# Patient Record
Sex: MALE | Race: WHITE | Employment: OTHER | URBAN - METROPOLITAN AREA
[De-identification: names, ages, dates, MRNs, and addresses within clinical notes are randomized per-mention and may not be internally consistent; named-entity substitution may affect disease eponyms.]

---

## 2018-01-24 ENCOUNTER — OFFICE VISIT (OUTPATIENT)
Dept: NEUROLOGY | Facility: CLINIC | Age: 73
End: 2018-01-24

## 2018-01-24 ENCOUNTER — TELEPHONE (OUTPATIENT)
Dept: NEUROLOGY | Facility: CLINIC | Age: 73
End: 2018-01-24

## 2018-01-24 VITALS
DIASTOLIC BLOOD PRESSURE: 90 MMHG | RESPIRATION RATE: 16 BRPM | WEIGHT: 180 LBS | HEART RATE: 80 BPM | BODY MASS INDEX: 27 KG/M2 | SYSTOLIC BLOOD PRESSURE: 156 MMHG

## 2018-01-24 DIAGNOSIS — R41.3 MEMORY LOSS: Primary | ICD-10-CM

## 2018-01-24 PROCEDURE — 99205 OFFICE O/P NEW HI 60 MIN: CPT | Performed by: OTHER

## 2018-01-24 RX ORDER — ASCORBIC ACID 500 MG
500 TABLET ORAL DAILY
COMMUNITY

## 2018-01-24 RX ORDER — BUPRENORPHINE HCL 8 MG/1
1 TABLET SUBLINGUAL DAILY
COMMUNITY

## 2018-01-24 NOTE — PATIENT INSTRUCTIONS
Refill policies:    • Allow 2-3 business days for refills; controlled substances may take longer.   • Contact your pharmacy at least 5 days prior to running out of medication and have them send an electronic request or submit request through the [facility] recommended that you have a procedure or additional testing performed. [facility]) will contact your insurance carrier to obtain pre-certification or prior authorization.     Unfortunately, [facility] has seen an increase in denial of paym

## 2018-01-24 NOTE — PROGRESS NOTES
Neurology H&P    Lily Cummings Vivek Patient Status:  No patient class for patient encounter    1945 MRN [id number]   Location [address] [place] [address], [address] [place] [address] Drive, [address] [place] [address] Attending No att. providers found   Hosp Day # 0 PCP JOSEPHINE Larkin past medical history on file. PSHx:  No past surgical history on file. SocHx:  Tobacco/Alcohol use not on file    Family History:  No family history on file.     ROS:  Gen: no unexplained weight loss  Vision: no vision changes, no new blurry or double negative    Labs:       Imaging:  MRI brain from OSH imaging consistent with mild microvascular ischemic changes. No strokes     EEG OSH no ictal or interictal activity. Assessment: This is a 73 y/o male with mild short term memory loss.  His MOCA today

## 2018-07-30 ENCOUNTER — OFFICE VISIT (OUTPATIENT)
Dept: NEUROLOGY | Facility: CLINIC | Age: 73
End: 2018-07-30
Payer: MEDICARE

## 2018-07-30 VITALS
DIASTOLIC BLOOD PRESSURE: 80 MMHG | HEART RATE: 80 BPM | BODY MASS INDEX: 27 KG/M2 | WEIGHT: 184 LBS | SYSTOLIC BLOOD PRESSURE: 140 MMHG

## 2018-07-30 DIAGNOSIS — R41.3 MEMORY LOSS: Primary | ICD-10-CM

## 2018-07-30 PROCEDURE — 99213 OFFICE O/P EST LOW 20 MIN: CPT | Performed by: OTHER

## 2018-07-30 NOTE — PATIENT INSTRUCTIONS
Refill policies:    • Allow 2-3 business days for refills; controlled substances may take longer.   • Contact your pharmacy at least 5 days prior to running out of medication and have them send an electronic request or submit request through the “request re entire amount billed. Precertification and Prior Authorizations: If your physician has recommended that you have a procedure or additional testing performed.   [facility]) will contact your insurance carrier to obtain pre-certi

## 2018-07-30 NOTE — PROGRESS NOTES
Neurology H&P    [place] Debar Fish Patient Status:  No patient class for patient encounter    1945 MRN [id number]   Location ED [facility], [address], [address] [facility] Attending No att. providers found   Hosp Day # 0 PCP JOSEPHINE Morillo memory loss but he declined. He states that since that time his memory has been stable. His wife states \"some days are better than others?  She feels that he is quieter than in years past. Per wife he may become more repetitious if there is a lot of [place] unexplained fevers or chills  Endocrine: no unexplained weight loss or gain, no new fatigue  Musculoskeletal: denies back pain, denies joint pain  Psych: denies depression   Skin: denies any new masses, rashes, lumps or bruising    Objective/Physical Exam: related memory loss  - Referral to neuropsychology for memory loss was discussed but he  Again declines  - MOCA 1/23/18: 26/30  - MOCA 7/30/18: 27/30  - No medications at this time    Can follow up PRN for any new memory changes    Debra Carballo DO  N

## 2018-07-30 NOTE — PROGRESS NOTES
Pt is here for follow up for memory loss. Pt states since the last time we seen him he has been the same with memory.

## 2019-09-26 ENCOUNTER — OFFICE VISIT (OUTPATIENT)
Dept: NEUROLOGY | Facility: CLINIC | Age: 74
End: 2019-09-26
Payer: MEDICARE

## 2019-09-26 VITALS
SYSTOLIC BLOOD PRESSURE: 120 MMHG | BODY MASS INDEX: 27 KG/M2 | HEART RATE: 70 BPM | RESPIRATION RATE: 18 BRPM | WEIGHT: 183 LBS | DIASTOLIC BLOOD PRESSURE: 68 MMHG

## 2019-09-26 DIAGNOSIS — R41.3 MEMORY LOSS: Primary | ICD-10-CM

## 2019-09-26 DIAGNOSIS — H53.9 VISION CHANGES: ICD-10-CM

## 2019-09-26 PROCEDURE — 99214 OFFICE O/P EST MOD 30 MIN: CPT | Performed by: OTHER

## 2019-09-26 RX ORDER — AMLODIPINE BESYLATE 5 MG/1
2.5 TABLET ORAL DAILY
Refills: 1 | COMMUNITY
Start: 2019-09-03

## 2019-09-26 RX ORDER — LATANOPROST 50 UG/ML
1 SOLUTION/ DROPS OPHTHALMIC
Refills: 4 | COMMUNITY
Start: 2019-07-18

## 2019-09-26 RX ORDER — GLIMEPIRIDE 2 MG/1
1 TABLET ORAL
Refills: 6 | COMMUNITY
Start: 2019-06-23 | End: 2020-12-08 | Stop reason: ALTCHOICE

## 2019-09-26 NOTE — PROGRESS NOTES
Neurology H&P    Charlie Jamel Doyle Patient Status:  No patient class for patient encounter    1945 MRN [id number]   Location ED [facility], [address], [address] [facility] Attending No att. providers found   Hosp Day # 0 PCP JOSEPHINE Fitzpatrick occurred on Aug 31st.  He was sitting at the kitchen table and noted a \"Fuzziness\" in his right upper quadrant of his vision. He does not know if this was in one eye or both eyes. He also had a concomitant headache with this fuzzy vision.  All symptoms la mouth daily. Disp:  Rfl:    Vitamin C 500 MG Oral Tab Take 500 mg by mouth daily. Disp:  Rfl:    Cholecalciferol (VITAMIN D) 1000 units Oral Tab Take by mouth daily. Disp:  Rfl:    B Complex-C-Folic Acid Oral Tab Take by mouth daily.  Disp:  Rfl:        Pro EOM intact, facial sensation intact, strong eye closure and lower facial muscles & jaw muscles; palate elevation intact, no dysarthria, no tongue fasciculations or atrophy, strong shoulder shrug.     MOTOR EXAMINATION: normal tone, no fasciculations, normal

## 2019-09-26 NOTE — PROGRESS NOTES
The patient states no stroke like symptoms. The patient states no changes in speech, balance or memory.

## 2019-11-15 ENCOUNTER — TELEPHONE (OUTPATIENT)
Dept: NEUROLOGY | Facility: CLINIC | Age: 74
End: 2019-11-15

## 2019-11-15 NOTE — TELEPHONE ENCOUNTER
Brief Neurology note:  Neuropsychology testing received and sent to scanning. In brief Mr. Doyle has impaired recall both immediate and delayed and appears to have cognitive slowing and decreased verbal fluency.

## 2019-12-03 ENCOUNTER — OFFICE VISIT (OUTPATIENT)
Dept: NEUROLOGY | Facility: CLINIC | Age: 74
End: 2019-12-03
Payer: MEDICARE

## 2019-12-03 VITALS
HEART RATE: 68 BPM | DIASTOLIC BLOOD PRESSURE: 64 MMHG | WEIGHT: 180 LBS | BODY MASS INDEX: 27 KG/M2 | SYSTOLIC BLOOD PRESSURE: 124 MMHG | RESPIRATION RATE: 16 BRPM

## 2019-12-03 DIAGNOSIS — R41.3 MEMORY LOSS: Primary | ICD-10-CM

## 2019-12-03 PROCEDURE — 99213 OFFICE O/P EST LOW 20 MIN: CPT | Performed by: OTHER

## 2019-12-03 RX ORDER — DONEPEZIL HYDROCHLORIDE 5 MG/1
5 TABLET, FILM COATED ORAL NIGHTLY
Qty: 30 TABLET | Refills: 1 | Status: SHIPPED | OUTPATIENT
Start: 2019-12-03 | End: 2020-02-26

## 2019-12-03 NOTE — PROGRESS NOTES
The patient wife states no changes in memory since his last office visit. The patient denies any headaches, changes in balance or speech. Denies any mood swings.

## 2019-12-03 NOTE — PROGRESS NOTES
Neurology H&P    Olson Parvez Doyle Patient Status:  No patient class for patient encounter    1945 MRN [id number]   Location [address] [place] [address], [address] [place] [address] Drive, [address] [place] [address] Attending No att. providers found   Hosp Day # 0 PCP JOSEPHINE Brown reports he has impaired recall both immediate and delayed and appears to have cognitive slowing and decreased verbal fluency. Per wife he has poor recall and consistently needs to have information repeated to him.  He has not had any falls or hallucinations new bruising, no unexplained fevers or chills  Endocrine: no unexplained weight loss or gain, no new fatigue  Musculoskeletal: denies back pain, denies joint pain  Psych: denies depression   Skin: denies any new masses, rashes, lumps or bruising    Objecti loss  - [facility]  - Neuropsychology report in [facility]  - [address] [place] [address], Ne 1/23/18: 26/30  - [address] [place] [address], Ne 7/30/18: 27/30  - Patient would like to start trial of Aricept and given severity of deficits in recall and verbal fluency and results of neuropsychology testing this is reasonabl

## 2019-12-05 RX ORDER — DONEPEZIL HYDROCHLORIDE 5 MG/1
TABLET, FILM COATED ORAL
Qty: 90 TABLET | Refills: 0 | OUTPATIENT
Start: 2019-12-05

## 2019-12-05 NOTE — TELEPHONE ENCOUNTER
Refused. Patient was just started on this medication.       Medication: DONEPEZIL HCL 5 MG Oral Tab     Date of last refill: 12/03/19 (#30/1 )  [place]    Last office visit: 12/3/2019  Due back to clinic per last offi

## 2019-12-30 DIAGNOSIS — R41.3 MEMORY LOSS: Primary | ICD-10-CM

## 2019-12-31 RX ORDER — DONEPEZIL HYDROCHLORIDE 5 MG/1
TABLET, FILM COATED ORAL
Qty: 90 TABLET | Refills: 0 | OUTPATIENT
Start: 2019-12-31

## 2019-12-31 NOTE — TELEPHONE ENCOUNTER
Spoke with [facility]	[place] and they state the pt has no refills. I gave verbal of rx sent on 12/3/19 with readback.      Medication: DONEPEZIL HCL 5 MG Oral Tab    Date of last refill: 12/3/19 (#30/1)  [place]    [place]

## 2020-02-26 DIAGNOSIS — R41.3 MEMORY LOSS: Primary | ICD-10-CM

## 2020-02-26 RX ORDER — DONEPEZIL HYDROCHLORIDE 5 MG/1
TABLET, FILM COATED ORAL
Qty: 30 TABLET | Refills: 2 | Status: SHIPPED | OUTPATIENT
Start: 2020-02-26 | End: 2020-03-04

## 2020-02-26 NOTE — TELEPHONE ENCOUNTER
Medication: DONEPEZIL HCL 5 MG Oral Tab    Date of last refill: 12/03/19 (#30/1)  [place]    Last office visit: 12/3/2019  Due back to clinic per last office note:  Around 03/03/2020  Date next office visit scheduled

## 2020-03-04 ENCOUNTER — OFFICE VISIT (OUTPATIENT)
Dept: NEUROLOGY | Facility: CLINIC | Age: 75
End: 2020-03-04
Payer: MEDICARE

## 2020-03-04 VITALS
HEART RATE: 68 BPM | RESPIRATION RATE: 16 BRPM | DIASTOLIC BLOOD PRESSURE: 68 MMHG | WEIGHT: 171 LBS | BODY MASS INDEX: 25 KG/M2 | SYSTOLIC BLOOD PRESSURE: 116 MMHG

## 2020-03-04 DIAGNOSIS — R41.3 MEMORY LOSS: Primary | ICD-10-CM

## 2020-03-04 PROCEDURE — 99213 OFFICE O/P EST LOW 20 MIN: CPT | Performed by: OTHER

## 2020-03-04 RX ORDER — BRIMONIDINE TARTRATE 2 MG/ML
1 SOLUTION/ DROPS OPHTHALMIC 2 TIMES DAILY
COMMUNITY
Start: 2020-02-06 | End: 2020-12-08 | Stop reason: ALTCHOICE

## 2020-03-04 RX ORDER — DONEPEZIL HYDROCHLORIDE 5 MG/1
5 TABLET, FILM COATED ORAL NIGHTLY
Qty: 90 TABLET | Refills: 1 | Status: SHIPPED | OUTPATIENT
Start: 2020-03-04 | End: 2020-09-08

## 2020-03-04 NOTE — PROGRESS NOTES
Neurology H&P    Betsy Wu Vivek Patient Status:  No patient class for patient encounter    1945 MRN [id number]   Location [address] [place] [address], [address] [place] [address] Drive, [address] [place] [address] Attending No att. providers found   Hosp Day # 0 PCP JOSEPHINE Rabago No hallucinations. No falls or gait imbalance. States the feels well today.        Current Medications:  Current Outpatient Medications   Medication Sig Dispense Refill   • DONEPEZIL HCL 5 MG Oral Tab TAKE 1 TABLET(5 MG) BY MOUTH EVERY NIGHT 30 tablet 2   • back pain, denies joint pain  Psych: denies depression   Skin: denies any new masses, rashes, lumps or bruising    Objective/Physical Exam:    Vital Signs:  Blood pressure 116/68, pulse 68, resp. rate 16, weight 171 lb (77.6 kg).     Gen: Awake and in no ap DO  Neurology

## 2020-09-08 ENCOUNTER — OFFICE VISIT (OUTPATIENT)
Dept: NEUROLOGY | Facility: CLINIC | Age: 75
End: 2020-09-08
Payer: MEDICARE

## 2020-09-08 VITALS
DIASTOLIC BLOOD PRESSURE: 64 MMHG | HEART RATE: 60 BPM | BODY MASS INDEX: 24 KG/M2 | RESPIRATION RATE: 16 BRPM | WEIGHT: 160 LBS | SYSTOLIC BLOOD PRESSURE: 112 MMHG

## 2020-09-08 DIAGNOSIS — R41.3 MEMORY LOSS: Primary | ICD-10-CM

## 2020-09-08 PROCEDURE — 99213 OFFICE O/P EST LOW 20 MIN: CPT | Performed by: OTHER

## 2020-09-08 NOTE — PROGRESS NOTES
Neurology H&P    Gibson Doyle Patient Status:  No patient class for patient encounter    1945 MRN [id number]   Location [address] [place] [address], [address] [place] [address] Drive, [address] [place] [address] Attending No att. providers found   Hosp Day # 0 PCP JOSEPHINE Still Qday. He has felt fine since that time. He state that he has very vivid dreams. He states that these dreams are disturbing to him. He denies any falls. Denies nay hallucination. He denies any new numbness weakness or tingling.  Per wife he is having more sh ROS:  10 point ROS completed and pertinent positives in HPI    Objective/Physical Exam:    Vital Signs:  Blood pressure 112/64, pulse 60, resp. rate 16, weight 160 lb (72.6 kg).     Gen: Awake and in no apparent distress  HEENT: moist mucus membranes 27/30  - Stop Aricept as he states that he has been having disturbing dreams and feels that Aricept is causing this          - Call me back in 3 weeks and if bad dreams stop then we can try Namanda if needed    Marie Sheehan, DO  Neurology

## 2020-09-30 ENCOUNTER — TELEPHONE (OUTPATIENT)
Dept: NEUROLOGY | Facility: CLINIC | Age: 75
End: 2020-09-30

## 2020-09-30 RX ORDER — MEMANTINE HYDROCHLORIDE 5 MG/1
TABLET ORAL
Qty: 70 TABLET | Refills: 0 | Status: SHIPPED | OUTPATIENT
Start: 2020-09-30 | End: 2020-11-02

## 2020-10-22 ENCOUNTER — TELEPHONE (OUTPATIENT)
Dept: SURGERY | Facility: CLINIC | Age: 75
End: 2020-10-22

## 2020-10-22 NOTE — TELEPHONE ENCOUNTER
Relayed to pt's wife, Sabrina (okay per HIPAA). Stated understanding of information. Pt is doing well at this time, appreciative of call back.

## 2020-11-02 ENCOUNTER — TELEPHONE (OUTPATIENT)
Dept: NEUROLOGY | Facility: CLINIC | Age: 75
End: 2020-11-02

## 2020-11-02 DIAGNOSIS — R41.3 MEMORY LOSS: Primary | ICD-10-CM

## 2020-11-02 RX ORDER — MEMANTINE HYDROCHLORIDE 5 MG/1
TABLET ORAL
Qty: 180 TABLET | Refills: 0 | Status: SHIPPED | OUTPATIENT
Start: 2020-11-02 | End: 2020-11-09

## 2020-11-02 NOTE — TELEPHONE ENCOUNTER
pts wife states pt is almost out of Memantine; pts wife states Dr changed dosage and decreased frequency; pts wife would like to know if Dr wants to keep it the same or increase again; pls call

## 2020-11-02 NOTE — TELEPHONE ENCOUNTER
See TE dated  9/30/2020 & 10/22/2020    Per phone call: Spoke to pt's wife, Sabrina (okay per HIPAA). Pt taking 5 mg BID currently, is doing well on current dose/frequency.  Pt's wife verbalized pt did well on 1 tab in the morning and 2 tabs QHS, that 2 tabs

## 2020-11-09 NOTE — TELEPHONE ENCOUNTER
Spoke with patient's wife (Claude Carney per [employer]) and informed her that per notes below, Dr. Clover Gonsalez gave OK for patient to stop medication at this time and re-evaluate at next OV. VU and denies any questions.     Medications removed from current medication lis

## 2020-11-09 NOTE — TELEPHONE ENCOUNTER
Dose lowered on 11/2/2020 to 5 mg BID    Patient remained dizzy. Next OV is 12/8/2020. Patient would like to know if he should continue on memantine or discuss at next OV. Routed to provider.

## 2020-11-09 NOTE — TELEPHONE ENCOUNTER
LMTCB:    Valery Moraes, DO         9:28 AM  Note     Ok to hold mementine if this is causing his dizziness.  Unfortunately he has had intolerable side effects on Namenda and donepezil both at low dose so I have no further recommendations for him at th

## 2020-11-09 NOTE — TELEPHONE ENCOUNTER
Ok to hold mementine if this is causing his dizziness. Unfortunately he has had intolerable side effects on Namenda and donepezil both at low dose so I have no further recommendations for him at this time.

## 2020-11-09 NOTE — TELEPHONE ENCOUNTER
After lowering the frequency of the Memantine, pt was still very dizzy, last dose was Tuesday morning, had symptoms into Tuesday evening. Blood pressure and pulse was fine. Next day he was fine but afraid to give him another dose. Pt's next appt is Dec 8th.

## 2020-12-07 ENCOUNTER — TELEPHONE (OUTPATIENT)
Dept: NEUROLOGY | Facility: CLINIC | Age: 75
End: 2020-12-07

## 2020-12-07 NOTE — TELEPHONE ENCOUNTER
At this time, one visitor per patient unless extreme circumstance. Left message for patient's spouse to discuss. Spoke with patient's daughter and discussed visitor policy.  Daughter is an RN and verbalized she \"completely understood\" the visitor

## 2020-12-08 ENCOUNTER — OFFICE VISIT (OUTPATIENT)
Dept: NEUROLOGY | Facility: CLINIC | Age: 75
End: 2020-12-08
Payer: MEDICARE

## 2020-12-08 VITALS
WEIGHT: 173 LBS | BODY MASS INDEX: 26 KG/M2 | DIASTOLIC BLOOD PRESSURE: 70 MMHG | HEART RATE: 64 BPM | SYSTOLIC BLOOD PRESSURE: 130 MMHG | RESPIRATION RATE: 16 BRPM

## 2020-12-08 DIAGNOSIS — R41.3 MEMORY LOSS: Primary | ICD-10-CM

## 2020-12-08 PROCEDURE — 99214 OFFICE O/P EST MOD 30 MIN: CPT | Performed by: OTHER

## 2020-12-08 RX ORDER — ACETAMINOPHEN 500 MG
1000 TABLET ORAL
COMMUNITY

## 2020-12-08 RX ORDER — DORZOLAMIDE HYDROCHLORIDE AND TIMOLOL MALEATE 20; 5 MG/ML; MG/ML
1 SOLUTION/ DROPS OPHTHALMIC 2 TIMES DAILY
COMMUNITY

## 2020-12-08 NOTE — PROGRESS NOTES
Neurology H&P    Olson Parvez Doyle Patient Status:  No patient class for patient encounter    1945 MRN [id number]   Location [address] [place] [address], [address] [place] [address] Drive, [address] [place] [address] Attending No att. providers found   Hosp Day # 0 PCP JOSEPHINE Brown starting doses. He reported intolerable side effects top both even at low doses. He comes back for follow up today. He states that he has been doing well. He feels that his memory is stable. He has not had any falls. He reports no hallucinations.  He has no Onset   • Stroke Father    • Cancer Mother         pancreatic       ROS:  10 point ROS completed and pertinent positives in HPI    Objective/Physical Exam:    Vital Signs:  Weight 173 lb (78.5 kg).     Gen: Awake and in no apparent distress  HEENT: moist mu questions with family and patient today    Plan:  1.  Memory loss  - [facility]CI vs early dementia  - Neuropsychology report in [facility]  - [address] [place] [address], Ne 1/23/18: 26/30  - [address] [place] [address], Ne 7/30/18: 27/30  - Has tried both Aricept and Namenda both at low starting doses and reported intolera

## 2021-03-23 NOTE — TELEPHONE ENCOUNTER
Called patient and instructed him per notes below to take 5 mg Memantine BID as per noted below. Rx given to get patient to next OV. Note     continue on Namenda 1 tab (5mg) BID if he is doing ok oin this dose.  10mg BID caused side effects            Me Lacrimal punctum snip incision: LLL

## 2021-06-08 ENCOUNTER — OFFICE VISIT (OUTPATIENT)
Dept: NEUROLOGY | Facility: CLINIC | Age: 76
End: 2021-06-08
Payer: MEDICARE

## 2021-06-08 VITALS
HEART RATE: 70 BPM | DIASTOLIC BLOOD PRESSURE: 68 MMHG | BODY MASS INDEX: 26 KG/M2 | SYSTOLIC BLOOD PRESSURE: 120 MMHG | WEIGHT: 173 LBS | RESPIRATION RATE: 16 BRPM

## 2021-06-08 DIAGNOSIS — R41.3 MEMORY LOSS: Primary | ICD-10-CM

## 2021-06-08 PROCEDURE — 99213 OFFICE O/P EST LOW 20 MIN: CPT | Performed by: OTHER

## 2021-06-08 NOTE — PROGRESS NOTES
Neurology H&P    Jaylene Griselda Doyle Patient Status:  No patient class for patient encounter    1945 MRN [id number]   Location [address] [place] [address], [address] [place] [address] Drive, [address] [place] [address] Attending No att. providers found   Hosp Day # 0 PCP JOSEPHINE Johnson starting doses. He reported intolerable side effects top both even at low doses. He comes back for follow up today. His wife comes with his and daughter is on the phone. He himself states that he is fine.  His wife feels that his short term memory is gettin Former Smoker        Quit date:         Years since quittin.4      Smokeless tobacco: Never Used    Vaping Use      Vaping Use: Never used    Alcohol use: No    Drug use: No      Family History:  Family History   Problem Relation Age of Onset   • Plan:  1.  Memory loss  - [facility]CI vs early dementia  - Neuropsychology report in [facility]  - [address] [facility], Ne 1/23/18: 26/30  - [address] [facility], Ne 7/30/18: 27/30  - Has tried both Aricept and Namenda both at low starting doses and reported intolerable side effects to both  - Declines Ex

## 2022-06-13 ENCOUNTER — OFFICE VISIT (OUTPATIENT)
Dept: NEUROLOGY | Facility: CLINIC | Age: 77
End: 2022-06-13
Payer: MEDICARE

## 2022-06-13 VITALS
SYSTOLIC BLOOD PRESSURE: 138 MMHG | HEIGHT: 66 IN | WEIGHT: 171 LBS | DIASTOLIC BLOOD PRESSURE: 88 MMHG | RESPIRATION RATE: 16 BRPM | HEART RATE: 58 BPM | BODY MASS INDEX: 27.48 KG/M2

## 2022-06-13 DIAGNOSIS — R41.3 MEMORY LOSS: Primary | ICD-10-CM

## 2022-06-13 PROCEDURE — 99213 OFFICE O/P EST LOW 20 MIN: CPT | Performed by: OTHER

## 2022-06-16 NOTE — TELEPHONE ENCOUNTER
Called patient's spouse back and she state's patient's \"vivid/disturbing dreams\" have subsided. Per OV notes, patient could try Namenda. Pended 10 mg memantine Rx and routed to provider to determine sig. Left leg pain. Patient states when she steps on her left leg it feels like there is a big ball in her left foot. Pain started last night and progressively got worse. Patient's leg also feels numb.  Patient has not had pain like this in the past.

## 2023-01-01 ENCOUNTER — EXTERNAL RECORD (OUTPATIENT)
Dept: HEALTH INFORMATION MANAGEMENT | Age: 78
End: 2023-01-01

## 2023-06-13 ENCOUNTER — OFFICE VISIT (OUTPATIENT)
Dept: NEUROLOGY | Facility: CLINIC | Age: 78
End: 2023-06-13
Payer: MEDICARE

## 2023-06-13 VITALS
WEIGHT: 171 LBS | RESPIRATION RATE: 16 BRPM | DIASTOLIC BLOOD PRESSURE: 74 MMHG | BODY MASS INDEX: 28 KG/M2 | OXYGEN SATURATION: 97 % | SYSTOLIC BLOOD PRESSURE: 124 MMHG | HEART RATE: 56 BPM

## 2023-06-13 DIAGNOSIS — R41.3 MEMORY LOSS: Primary | ICD-10-CM

## 2023-06-13 PROCEDURE — 99213 OFFICE O/P EST LOW 20 MIN: CPT | Performed by: OTHER

## 2023-06-13 NOTE — PROGRESS NOTES
Patient has a decrease in short and long term memory per family. Patient family denies changes to memory, speech or balance.

## 2023-08-03 ENCOUNTER — EXTERNAL LAB (OUTPATIENT)
Dept: OTHER | Age: 78
End: 2023-08-03

## 2023-08-03 LAB — LAB RESULT: NORMAL

## 2023-08-15 RX ORDER — METOPROLOL SUCCINATE 25 MG/1
25 TABLET, EXTENDED RELEASE ORAL DAILY
COMMUNITY

## 2023-08-15 RX ORDER — VITAMIN B COMPLEX
25 TABLET ORAL DAILY
COMMUNITY

## 2023-08-15 RX ORDER — LATANOPROST 50 UG/ML
1 SOLUTION/ DROPS OPHTHALMIC NIGHTLY
COMMUNITY

## 2023-08-15 RX ORDER — ASCORBIC ACID 500 MG
500 TABLET ORAL DAILY
COMMUNITY

## 2023-08-17 ENCOUNTER — APPOINTMENT (OUTPATIENT)
Dept: CARDIOLOGY | Age: 78
End: 2023-08-17
Attending: INTERNAL MEDICINE

## 2023-08-17 ENCOUNTER — HOSPITAL ENCOUNTER (OUTPATIENT)
Age: 78
Discharge: HOME | End: 2023-08-18
Attending: INTERNAL MEDICINE | Admitting: INTERNAL MEDICINE

## 2023-08-17 ENCOUNTER — ANESTHESIA EVENT (OUTPATIENT)
Dept: CARDIOLOGY | Age: 78
End: 2023-08-17

## 2023-08-17 ENCOUNTER — ANESTHESIA (OUTPATIENT)
Dept: CARDIOLOGY | Age: 78
End: 2023-08-17

## 2023-08-17 DIAGNOSIS — Z98.890 S/P ABLATION OF ATRIAL FIBRILLATION: Primary | ICD-10-CM

## 2023-08-17 DIAGNOSIS — Z86.79 S/P ABLATION OF ATRIAL FIBRILLATION: Primary | ICD-10-CM

## 2023-08-17 LAB
ATRIAL RATE (BPM): 249
ATRIAL RATE (BPM): 72
LV EF: NORMAL %
P AXIS (DEGREES): 91
PR-INTERVAL (MSEC): 204
QRS-INTERVAL (MSEC): 78
QRS-INTERVAL (MSEC): 96
QT-INTERVAL (MSEC): 338
QT-INTERVAL (MSEC): 430
QTC: 438
QTC: 471
R AXIS (DEGREES): 3
R AXIS (DEGREES): 4
REPORT TEXT: NORMAL
REPORT TEXT: NORMAL
T AXIS (DEGREES): -11
T AXIS (DEGREES): 7
VENTRICULAR RATE EKG/MIN (BPM): 101
VENTRICULAR RATE EKG/MIN (BPM): 72

## 2023-08-17 PROCEDURE — C1894 INTRO/SHEATH, NON-LASER: HCPCS | Performed by: INTERNAL MEDICINE

## 2023-08-17 PROCEDURE — 10002801 HB RX 250 W/O HCPCS

## 2023-08-17 PROCEDURE — 10002807 HB RX 258

## 2023-08-17 PROCEDURE — 10002800 HB RX 250 W HCPCS

## 2023-08-17 PROCEDURE — 10006027 HB SUPPLY 278: Performed by: INTERNAL MEDICINE

## 2023-08-17 PROCEDURE — C1766 INTRO/SHEATH,STRBLE,NON-PEEL: HCPCS | Performed by: INTERNAL MEDICINE

## 2023-08-17 PROCEDURE — 93653 COMPRE EP EVAL TX SVT: CPT | Performed by: INTERNAL MEDICINE

## 2023-08-17 PROCEDURE — 10006023 HB SUPPLY 272: Performed by: INTERNAL MEDICINE

## 2023-08-17 PROCEDURE — 13000004 HB  ANESTHESIA  GENERAL OUTSIDE OR: Performed by: INTERNAL MEDICINE

## 2023-08-17 PROCEDURE — C1731 CATH, EP, 20 OR MORE ELEC: HCPCS | Performed by: INTERNAL MEDICINE

## 2023-08-17 PROCEDURE — C1732 CATH, EP, DIAG/ABL, 3D/VECT: HCPCS | Performed by: INTERNAL MEDICINE

## 2023-08-17 PROCEDURE — 13003411 HB AAH IL EXTENDED RECOVERY PER HOUR

## 2023-08-17 PROCEDURE — C1760 CLOSURE DEV, VASC: HCPCS | Performed by: INTERNAL MEDICINE

## 2023-08-17 PROCEDURE — 93005 ELECTROCARDIOGRAM TRACING: CPT | Performed by: INTERNAL MEDICINE

## 2023-08-17 PROCEDURE — C1759 CATH, INTRA ECHOCARDIOGRAPHY: HCPCS | Performed by: INTERNAL MEDICINE

## 2023-08-17 PROCEDURE — 10002800 HB RX 250 W HCPCS: Performed by: INTERNAL MEDICINE

## 2023-08-17 PROCEDURE — 93662 INTRACARDIAC ECG (ICE): CPT | Performed by: INTERNAL MEDICINE

## 2023-08-17 PROCEDURE — 93312 ECHO TRANSESOPHAGEAL: CPT

## 2023-08-17 PROCEDURE — 13000001 HB PHASE II RECOVERY EA 30 MINUTES: Performed by: INTERNAL MEDICINE

## 2023-08-17 PROCEDURE — 10002803 HB RX 637: Performed by: INTERNAL MEDICINE

## 2023-08-17 DEVICE — THE VASCADE MVP VENOUS VASCULAR CLOSURE SYSTEM (VVCS) IS INTENDED TO SEAL FEMORAL VEINS WITH SINGLE OR MULTIPLE ACCESS SITES IN ONE OR BOTH LIMBS AT THE COMPLETION OF CATHETERIZATION PROCEDURES. THE SYSTEM IS DESIGNED TO DELIVER A RESORBABLE COLLAGEN PATCH, EXTRA-VASCULARLY, AT VESSEL PUNCTURE SITES TO ACHIEVE HEMOSTASIS. FOR USE WITH 6FR TO 12FR (15F MAXIMUM OUTER DIAMETER) INTRODUCER SHEATHS; OVERALL LENGTH OF THE SHEATH (INCLUDING THE HUB) NEEDS TO BE LESS THAN 15CM.
Type: IMPLANTABLE DEVICE | Site: FEMORAL VEIN | Status: FUNCTIONAL
Brand: CARDIVA VASCADE MVP VVCS 6-12F

## 2023-08-17 RX ORDER — LIDOCAINE HYDROCHLORIDE 20 MG/ML
INJECTION, SOLUTION INFILTRATION; PERINEURAL PRN
Status: DISCONTINUED | OUTPATIENT
Start: 2023-08-17 | End: 2023-08-17

## 2023-08-17 RX ORDER — ASCORBIC ACID 500 MG
500 TABLET ORAL DAILY
Status: DISCONTINUED | OUTPATIENT
Start: 2023-08-17 | End: 2023-08-18 | Stop reason: HOSPADM

## 2023-08-17 RX ORDER — DOPAMINE HYDROCHLORIDE 160 MG/100ML
INJECTION, SOLUTION INTRAVENOUS CONTINUOUS PRN
Status: DISCONTINUED | OUTPATIENT
Start: 2023-08-17 | End: 2023-08-17

## 2023-08-17 RX ORDER — 0.9 % SODIUM CHLORIDE 0.9 %
2 VIAL (ML) INJECTION EVERY 12 HOURS SCHEDULED
Status: CANCELLED | OUTPATIENT
Start: 2023-08-17

## 2023-08-17 RX ORDER — ONDANSETRON 2 MG/ML
INJECTION INTRAMUSCULAR; INTRAVENOUS PRN
Status: DISCONTINUED | OUTPATIENT
Start: 2023-08-17 | End: 2023-08-17

## 2023-08-17 RX ORDER — DROPERIDOL 2.5 MG/ML
0.62 INJECTION, SOLUTION INTRAMUSCULAR; INTRAVENOUS
Status: CANCELLED | OUTPATIENT
Start: 2023-08-17

## 2023-08-17 RX ORDER — METOCLOPRAMIDE HYDROCHLORIDE 5 MG/ML
5 INJECTION INTRAMUSCULAR; INTRAVENOUS EVERY 6 HOURS PRN
Status: CANCELLED | OUTPATIENT
Start: 2023-08-17

## 2023-08-17 RX ORDER — NALOXONE HCL 0.4 MG/ML
0.2 VIAL (ML) INJECTION EVERY 5 MIN PRN
Status: CANCELLED | OUTPATIENT
Start: 2023-08-17

## 2023-08-17 RX ORDER — PROCHLORPERAZINE EDISYLATE 5 MG/ML
5 INJECTION INTRAMUSCULAR; INTRAVENOUS EVERY 4 HOURS PRN
Status: CANCELLED | OUTPATIENT
Start: 2023-08-17

## 2023-08-17 RX ORDER — SODIUM CHLORIDE 9 MG/ML
INJECTION, SOLUTION INTRAVENOUS CONTINUOUS PRN
Status: DISCONTINUED | OUTPATIENT
Start: 2023-08-17 | End: 2023-08-17

## 2023-08-17 RX ORDER — HYDRALAZINE HYDROCHLORIDE 20 MG/ML
5 INJECTION INTRAMUSCULAR; INTRAVENOUS EVERY 10 MIN PRN
Status: CANCELLED | OUTPATIENT
Start: 2023-08-17

## 2023-08-17 RX ORDER — ROCURONIUM BROMIDE 10 MG/ML
INJECTION, SOLUTION INTRAVENOUS PRN
Status: DISCONTINUED | OUTPATIENT
Start: 2023-08-17 | End: 2023-08-17

## 2023-08-17 RX ORDER — PROPOFOL 10 MG/ML
INJECTION, EMULSION INTRAVENOUS PRN
Status: DISCONTINUED | OUTPATIENT
Start: 2023-08-17 | End: 2023-08-17

## 2023-08-17 RX ORDER — ONDANSETRON 2 MG/ML
4 INJECTION INTRAMUSCULAR; INTRAVENOUS 2 TIMES DAILY PRN
Status: CANCELLED | OUTPATIENT
Start: 2023-08-17

## 2023-08-17 RX ORDER — LATANOPROST 50 UG/ML
1 SOLUTION/ DROPS OPHTHALMIC NIGHTLY
Status: DISCONTINUED | OUTPATIENT
Start: 2023-08-17 | End: 2023-08-18 | Stop reason: HOSPADM

## 2023-08-17 RX ORDER — PHENYLEPHRINE HYDROCHLORIDE 10 MG/ML
INJECTION, SOLUTION INTRAMUSCULAR; INTRAVENOUS; SUBCUTANEOUS PRN
Status: DISCONTINUED | OUTPATIENT
Start: 2023-08-17 | End: 2023-08-17

## 2023-08-17 RX ORDER — DEXAMETHASONE SODIUM PHOSPHATE 4 MG/ML
INJECTION, SOLUTION INTRA-ARTICULAR; INTRALESIONAL; INTRAMUSCULAR; INTRAVENOUS; SOFT TISSUE PRN
Status: DISCONTINUED | OUTPATIENT
Start: 2023-08-17 | End: 2023-08-17

## 2023-08-17 RX ADMIN — DOPAMINE HYDROCHLORIDE 5 MCG/KG/MIN: 160 INJECTION, SOLUTION INTRAVENOUS at 12:32

## 2023-08-17 RX ADMIN — FENTANYL CITRATE 50 MCG: 50 INJECTION, SOLUTION INTRAMUSCULAR; INTRAVENOUS at 10:45

## 2023-08-17 RX ADMIN — LIDOCAINE HYDROCHLORIDE 5 ML: 20 INJECTION, SOLUTION INFILTRATION; PERINEURAL at 10:45

## 2023-08-17 RX ADMIN — FENTANYL CITRATE 50 MCG: 50 INJECTION, SOLUTION INTRAMUSCULAR; INTRAVENOUS at 11:08

## 2023-08-17 RX ADMIN — ROCURONIUM BROMIDE 40 MG: 10 INJECTION, SOLUTION INTRAVENOUS at 10:57

## 2023-08-17 RX ADMIN — SUGAMMADEX 200 MG: 100 INJECTION, SOLUTION INTRAVENOUS at 12:42

## 2023-08-17 RX ADMIN — Medication 120 MG: at 10:45

## 2023-08-17 RX ADMIN — APIXABAN 5 MG: 5 TABLET, FILM COATED ORAL at 20:02

## 2023-08-17 RX ADMIN — PHENYLEPHRINE HYDROCHLORIDE 100 MCG: 10 INJECTION, SOLUTION INTRAVENOUS at 11:32

## 2023-08-17 RX ADMIN — ROCURONIUM BROMIDE 10 MG: 10 INJECTION, SOLUTION INTRAVENOUS at 10:44

## 2023-08-17 RX ADMIN — SODIUM CHLORIDE: 9 INJECTION, SOLUTION INTRAVENOUS at 10:29

## 2023-08-17 RX ADMIN — LATANOPROST 1 DROP: 50 SOLUTION/ DROPS OPHTHALMIC at 20:02

## 2023-08-17 RX ADMIN — DEXAMETHASONE SODIUM PHOSPHATE 10 MG: 4 INJECTION, SOLUTION INTRAMUSCULAR; INTRAVENOUS at 10:45

## 2023-08-17 RX ADMIN — PHENYLEPHRINE HYDROCHLORIDE 100 MCG: 10 INJECTION, SOLUTION INTRAVENOUS at 11:35

## 2023-08-17 RX ADMIN — ONDANSETRON 4 MG: 2 INJECTION INTRAMUSCULAR; INTRAVENOUS at 12:37

## 2023-08-17 RX ADMIN — PHENYLEPHRINE HYDROCHLORIDE 100 MCG: 10 INJECTION, SOLUTION INTRAVENOUS at 11:14

## 2023-08-17 RX ADMIN — SODIUM CHLORIDE: 9 INJECTION, SOLUTION INTRAVENOUS at 12:21

## 2023-08-17 RX ADMIN — PHENYLEPHRINE HYDROCHLORIDE 100 MCG: 10 INJECTION, SOLUTION INTRAVENOUS at 12:34

## 2023-08-17 RX ADMIN — PHENYLEPHRINE HYDROCHLORIDE 10 MCG/MIN: 10 INJECTION INTRAVENOUS at 11:39

## 2023-08-17 RX ADMIN — PROPOFOL 120 MG: 10 INJECTION, EMULSION INTRAVENOUS at 10:45

## 2023-08-17 ASSESSMENT — PAIN SCALES - GENERAL
PAINLEVEL_OUTOF10: 0

## 2023-08-17 ASSESSMENT — PATIENT HEALTH QUESTIONNAIRE - PHQ9
1. LITTLE INTEREST OR PLEASURE IN DOING THINGS: NOT AT ALL
SUM OF ALL RESPONSES TO PHQ9 QUESTIONS 1 AND 2: 0
IS PATIENT ABLE TO COMPLETE PHQ2 OR PHQ9: YES
CLINICAL INTERPRETATION OF PHQ2 SCORE: NO FURTHER SCREENING NEEDED
2. FEELING DOWN, DEPRESSED OR HOPELESS: NOT AT ALL
SUM OF ALL RESPONSES TO PHQ9 QUESTIONS 1 AND 2: 0

## 2023-08-17 ASSESSMENT — COLUMBIA-SUICIDE SEVERITY RATING SCALE - C-SSRS
6. HAVE YOU EVER DONE ANYTHING, STARTED TO DO ANYTHING, OR PREPARED TO DO ANYTHING TO END YOUR LIFE?: NO
IS THE PATIENT ABLE TO COMPLETE C-SSRS: YES
1. WITHIN THE PAST MONTH, HAVE YOU WISHED YOU WERE DEAD OR WISHED YOU COULD GO TO SLEEP AND NOT WAKE UP?: NO
2. HAVE YOU ACTUALLY HAD ANY THOUGHTS OF KILLING YOURSELF?: NO

## 2023-08-18 ENCOUNTER — APPOINTMENT (OUTPATIENT)
Dept: CARDIOLOGY | Age: 78
End: 2023-08-18
Attending: INTERNAL MEDICINE

## 2023-08-18 VITALS
BODY MASS INDEX: 23.32 KG/M2 | WEIGHT: 162.92 LBS | HEIGHT: 70 IN | OXYGEN SATURATION: 97 % | RESPIRATION RATE: 20 BRPM | HEART RATE: 84 BPM | DIASTOLIC BLOOD PRESSURE: 97 MMHG | TEMPERATURE: 97.7 F | SYSTOLIC BLOOD PRESSURE: 162 MMHG

## 2023-08-18 LAB
AORTIC VALVE AREA (AVA): 0.69
ASCENDING AORTA (AAD): 8
AV STENOSIS SEVERITY TEXT: NORMAL
AVI LVOT PEAK GRADIENT (LVOTMG): 1
E WAVE DECELARATION TIME (MDT): 8.19
INTERVENTRICULAR SEPTUM IN END DIASTOLE (IVSD): 2.24
LEFT INTERNAL DIMENSION IN SYSTOLE (LVSD): 1
LEFT VENTRICULAR INTERNAL DIMENSION IN DIASTOLE (LVDD): 3.9
LEFT VENTRICULAR POSTERIOR WALL IN END DIASTOLE (LVPW): 5.4
LV EF: NORMAL %
MV E TISSUE VEL MED (MESV): 6.7
MV E WAVE VEL/E TISSUE VEL MED(MSR): 8.38
MV PEAK A VELOCITY (MVPAV): 322
MV PEAK E VELOCITY (MVPEV): 0.5
TRICUSPID VALVE ANNULAR PEAK VELOCITY (TVAPV): 48
TRICUSPID VALVE PEAK REGURGITATION VELOCITY (TRPV): 3.2
TV ESTIMATED RIGHT ARTERIAL PRESSURE (RAP): 16.9

## 2023-08-18 PROCEDURE — 93306 TTE W/DOPPLER COMPLETE: CPT | Performed by: INTERNAL MEDICINE

## 2023-08-18 PROCEDURE — 13003411 HB AAH IL EXTENDED RECOVERY PER HOUR

## 2023-08-18 PROCEDURE — 10002803 HB RX 637: Performed by: NURSE PRACTITIONER

## 2023-08-18 PROCEDURE — 93306 TTE W/DOPPLER COMPLETE: CPT

## 2023-08-18 PROCEDURE — 10002803 HB RX 637: Performed by: INTERNAL MEDICINE

## 2023-08-18 RX ORDER — METOPROLOL SUCCINATE 50 MG/1
25 TABLET, EXTENDED RELEASE ORAL DAILY
Status: DISCONTINUED | OUTPATIENT
Start: 2023-08-18 | End: 2023-08-18 | Stop reason: HOSPADM

## 2023-08-18 RX ADMIN — OXYCODONE HYDROCHLORIDE AND ACETAMINOPHEN 500 MG: 500 TABLET ORAL at 09:13

## 2023-08-18 RX ADMIN — APIXABAN 5 MG: 5 TABLET, FILM COATED ORAL at 09:13

## 2023-08-18 RX ADMIN — Medication 25 MCG: at 09:13

## 2023-08-18 RX ADMIN — METOPROLOL SUCCINATE 25 MG: 50 TABLET, EXTENDED RELEASE ORAL at 09:17

## 2023-08-18 ASSESSMENT — PAIN SCALES - GENERAL
PAINLEVEL_OUTOF10: 0
PAINLEVEL_OUTOF10: 0

## 2023-11-20 PROBLEM — G30.9 MODERATE ALZHEIMER'S DEMENTIA WITHOUT BEHAVIORAL DISTURBANCE, PSYCHOTIC DISTURBANCE, MOOD DISTURBANCE, OR ANXIETY (CMD): Status: ACTIVE | Noted: 2023-11-20

## 2023-11-20 PROBLEM — I48.91 ATRIAL FIBRILLATION WITH CONTROLLED VENTRICULAR RESPONSE (CMD): Status: ACTIVE | Noted: 2023-11-20

## 2023-11-20 PROBLEM — I10 ESSENTIAL HYPERTENSION: Status: ACTIVE | Noted: 2023-11-20

## 2023-11-20 PROBLEM — Z00.01 ENCOUNTER FOR WELL ADULT EXAM WITH ABNORMAL FINDINGS: Status: ACTIVE | Noted: 2023-11-20

## 2023-11-20 PROBLEM — F02.B0 MODERATE ALZHEIMER'S DEMENTIA WITHOUT BEHAVIORAL DISTURBANCE, PSYCHOTIC DISTURBANCE, MOOD DISTURBANCE, OR ANXIETY (CMD): Status: ACTIVE | Noted: 2023-11-20

## 2024-04-15 ENCOUNTER — OFFICE VISIT (OUTPATIENT)
Dept: INTERNAL MEDICINE CLINIC | Facility: CLINIC | Age: 79
End: 2024-04-15
Payer: MEDICARE

## 2024-04-15 VITALS
TEMPERATURE: 98 F | BODY MASS INDEX: 26.01 KG/M2 | SYSTOLIC BLOOD PRESSURE: 122 MMHG | DIASTOLIC BLOOD PRESSURE: 68 MMHG | OXYGEN SATURATION: 97 % | HEIGHT: 67.72 IN | WEIGHT: 169.63 LBS | HEART RATE: 79 BPM | RESPIRATION RATE: 16 BRPM

## 2024-04-15 DIAGNOSIS — R55 VASOVAGAL SYNCOPE: ICD-10-CM

## 2024-04-15 DIAGNOSIS — I48.92 ATRIAL FLUTTER, UNSPECIFIED TYPE (HCC): Primary | ICD-10-CM

## 2024-04-15 DIAGNOSIS — I10 PRIMARY HYPERTENSION: ICD-10-CM

## 2024-04-15 DIAGNOSIS — N40.1 BPH WITH OBSTRUCTION/LOWER URINARY TRACT SYMPTOMS: ICD-10-CM

## 2024-04-15 DIAGNOSIS — N13.8 BPH WITH OBSTRUCTION/LOWER URINARY TRACT SYMPTOMS: ICD-10-CM

## 2024-04-15 DIAGNOSIS — F03.A0 MILD DEMENTIA WITHOUT BEHAVIORAL DISTURBANCE, PSYCHOTIC DISTURBANCE, MOOD DISTURBANCE, OR ANXIETY, UNSPECIFIED DEMENTIA TYPE (HCC): ICD-10-CM

## 2024-04-15 DIAGNOSIS — H40.9 GLAUCOMA OF BOTH EYES, UNSPECIFIED GLAUCOMA TYPE: ICD-10-CM

## 2024-04-15 PROCEDURE — 99203 OFFICE O/P NEW LOW 30 MIN: CPT | Performed by: FAMILY MEDICINE

## 2024-04-15 PROCEDURE — 96127 BRIEF EMOTIONAL/BEHAV ASSMT: CPT | Performed by: FAMILY MEDICINE

## 2024-04-15 RX ORDER — LOSARTAN POTASSIUM 25 MG/1
25 TABLET ORAL DAILY
COMMUNITY
Start: 2023-08-30

## 2024-04-15 NOTE — PROGRESS NOTES
Bandar Doyle  1945    Chief Complaint   Patient presents with    Establish Care     ES rm - 9  Establish care       HPI:   Bandar Doyle is a 78 year old male who presents to establish care. He had recent ablation for atrial flutter and has been maintained in sinus rhythm and now off anticoagulation.  He did just recently establish with [facility].  He has been following with our neurology team for his cognitive impairment versus early dementia and will be having follow-up in .  He has not tolerated previous medications for his memory.  He also has history of vasovagal syncope but has not had a recent episode.  He is following with ophthalmology for his glaucoma.  He has history of a UroLift and inguinal hernia repair.    Current Outpatient Medications   Medication Sig Dispense Refill    losartan 25 MG Oral Tab Take 1 tablet (25 mg total) by mouth daily.      latanoprost 0.005 % Ophthalmic Solution Apply 1 drop to eye.  4      Allergies   Allergen Reactions    Influenza Vaccines OTHER (SEE COMMENTS)     Leg pain from waist to feet    Tetracyclines & Related HIVES      Past Medical History:    Essential hypertension    SVT (supra[facility]ricular tachycardia) ([facility])      Patient Active Problem List   Diagnosis    Memory loss    Vision changes      Past Surgical History:   Procedure Laterality Date    Hernia surgery      Other  2017    Loop recorder      Family History   Problem Relation Age of Onset    Stroke Father     Hypertension Mother     Cancer Mother         pancreatic    Diabetes Maternal Grandmother     Cancer Sister     Heart Disorder Brother       Social History     Socioeconomic History    Marital status:    Tobacco Use    Smoking status: Former     Current packs/day: 0.00     Types: Cigarettes     Quit date:      Years since quittin.3     Passive exposure: Past    Smokeless tobacco: Former   Vaping Use    Vaping status: Never Used   Substance and Sexual Activity    Alcohol use: No     Drug use: No         REVIEW OF SYSTEMS:   GENERAL: feels well otherwise, no recent illlness    EXAM:   /68 (BP Location: Left arm, Patient Position: Sitting, Cuff Size: large)   Pulse 79   Temp 98.4 °F (36.9 °C) (Temporal)   Resp 16   Ht 5' 7.72\" (1.72 m)   Wt 169 lb 9.6 oz (76.9 kg)   SpO2 97%   BMI 26.00 kg/m²   GENERAL: Well developed, well nourished,in no apparent distress  SKIN: No rash  HEENT: atraumatic, normocephalic  LUNGS: clear to auscultation  CARDIO: RRR without murmur    ASSESSMENT AND PLAN:   Bandar Doyle is a 78 year old male who presents to [facility] care.     Atrial flutter, unspecified type (HCC)  Status post ablation and has maintained sinus rhythm.  Following with cardiology at [facility].  Now off anticoagulation.    Vasovagal syncope  No recent episodes.  Will continue to monitor    Primary hypertension  Controlled on current treatment.    Glaucoma of both eyes, unspecified glaucoma type  Following with ophthalmology at [facility]    Mild dementia   Following with neurology.  Has not tolerated Aricept or Namenda.  Has upcoming follow-up with Dr. Perez in June.     BPH with obstruction/lower urinary tract symptoms  Status post UroLift.    History of inguinal hernia repair  Stable, CTM      Follow-up in November for Medicare exam.    All questions were answered and the patient agrees with the plan.     Thank you,  Romero Epstein MD

## 2024-05-26 ENCOUNTER — HOSPITAL ENCOUNTER (EMERGENCY)
Facility: HOSPITAL | Age: 79
Discharge: HOME | End: 2024-05-26
Attending: EMERGENCY MEDICINE

## 2024-05-26 VITALS
OXYGEN SATURATION: 96 % | SYSTOLIC BLOOD PRESSURE: 123 MMHG | HEART RATE: 58 BPM | RESPIRATION RATE: 19 BRPM | TEMPERATURE: 98 F | DIASTOLIC BLOOD PRESSURE: 75 MMHG

## 2024-05-26 DIAGNOSIS — R55 SYNCOPE, VASOVAGAL: Primary | ICD-10-CM

## 2024-05-26 LAB
ATRIAL RATE: 65 BPM
P AXIS: 104 DEGREES
P-R INTERVAL: 186 MS
Q-T INTERVAL: 410 MS
QRS DURATION: 90 MS
QTC CALCULATION (BEZET): 426 MS
R AXIS: -20 DEGREES
T AXIS: 26 DEGREES
VENTRICULAR RATE: 65 BPM

## 2024-05-26 PROCEDURE — 93010 ELECTROCARDIOGRAM REPORT: CPT

## 2024-05-26 PROCEDURE — 93005 ELECTROCARDIOGRAM TRACING: CPT

## 2024-05-26 PROCEDURE — 99284 EMERGENCY DEPT VISIT MOD MDM: CPT

## 2024-05-26 PROCEDURE — 99283 EMERGENCY DEPT VISIT LOW MDM: CPT

## 2024-05-26 NOTE — ED INITIAL ASSESSMENT (HPI)
Patient received via [facility] for loss of consciousness while at [facility] today. Per EMS, patient vitals were stable upon arrival, but patient was unresponsive for over 5 minutes. Per wife, he has had these episodes in the past and has history of SVT and dementia.

## 2024-05-26 NOTE — ED PROVIDER NOTES
Patient Seen in: [facility] Emergency Department      History     Chief Complaint   Patient presents with    Syncope     Stated Complaint: Syncope    Subjective:   78-year-old male, history of SVT, atrial flutter status post ablation, dementia, high functioning, hypertension, presents with his wife from charge for vasovagal episode via EMS.  Patient was up and down a lot for Faith, had an episode where he was staring off into space and not responding but that resolved.  Wife states this happens about once a year, usually in charge and he been diagnosed with vasovagal syncope in the past.  States this is exactly like similar episodes.  Both he and his wife are both adamant they do not want any workup.  States he is a high functioning dementia patient and they do not want in the hospital because he sundown's and they want him his home is much as possible because of all the above.  He is oriented.  He has no complaints at this time.  States he feels completely back to baseline.  No headache or dizziness.  No lightheadedness.  No chest pain cough or shortness of breath.  States it feels exactly like his previous vasovagal episodes.  Offered any combination of fluids, orthostatics, blood work, imaging.  They declined everything.  States they would like be discharged home to be with his family.  They understand I cannot rule out anything without any workup and they are totally comfortable with that.  Capacity make own decisions.  Discharged home with his wife at their request at this time without any workup, but understand they are welcome back anytime for any reason for reassessment            Objective:   Past Medical History:    Dementia (HCC)    Essential hypertension    SVT (supraventricular tachycardia) (HCC)              Past Surgical History:   Procedure Laterality Date    Hernia surgery  2013    Other  03/2017    Loop recorder                Social History     Socioeconomic History    Marital status:     Tobacco Use    Smoking status: Former     Current packs/day: 0.00     Types: Cigarettes     Quit date:      Years since quittin.4     Passive exposure: Past    Smokeless tobacco: Former   Vaping Use    Vaping status: Never Used   Substance and Sexual Activity    Alcohol use: No    Drug use: No              Review of Systems   Constitutional:  Negative for fever.   Respiratory:  Negative for shortness of breath.    Cardiovascular:  Negative for chest pain.   Gastrointestinal:  Negative for abdominal pain.   Neurological:  Positive for light-headedness. Negative for headaches.   Hematological:  Does not bruise/bleed easily.       Positive for stated complaint: Syncope  Other systems are as noted in HPI.  Constitutional and vital signs reviewed.      All other systems reviewed and negative except as noted above.    Physical Exam     ED Triage Vitals [24 1120]   /75   Pulse 68   Resp 16   Temp 97.5 °F (36.4 °C)   Temp src Temporal   SpO2 97 %   O2 Device None (Room air)       Current Vitals:   Vital Signs  BP: 123/75  Pulse: 58  Resp: 19  Temp: 97.5 °F (36.4 °C)  Temp src: Temporal  MAP (mmHg): 89    Oxygen Therapy  SpO2: 96 %  O2 Device: None (Room air)            Physical Exam  Vitals and nursing note reviewed.   Constitutional:       General: He is not in acute distress.     Appearance: He is not ill-appearing, toxic-appearing or diaphoretic.   HENT:      Head: Normocephalic.      Nose: Nose normal.      Mouth/Throat:      Mouth: Mucous membranes are moist.   Eyes:      Extraocular Movements: Extraocular movements intact.      Pupils: Pupils are equal, round, and reactive to light.   Cardiovascular:      Rate and Rhythm: Normal rate. Rhythm irregular.      Pulses: Normal pulses.   Pulmonary:      Effort: Pulmonary effort is normal. No respiratory distress.      Breath sounds: Normal breath sounds.   Abdominal:      Palpations: Abdomen is soft.   Musculoskeletal:         General: Normal  range of motion.      Cervical back: Neck supple.   Skin:     General: Skin is warm and dry.   Neurological:      Mental Status: He is alert. Mental status is at baseline.      Cranial Nerves: No cranial nerve deficit.      Sensory: No sensory deficit.      Motor: No weakness.      Coordination: Coordination normal.   Psychiatric:         Mood and Affect: Mood normal.         Behavior: Behavior normal.               ED Course     Labs Reviewed   RAINBOW DRAW BLUE   RAINBOW DRAW GOLD   RAINBOW DRAW LIGHT GREEN   RAINBOW DRAW LAVENDER     EKG    Rate, intervals and axes as noted on EKG Report.  Rate: 65  Rhythm: Sinus Rhythm  Reading: EKG sinus rhythm with PACs at 65 bpm.  No ST elevations.  Intervals appear stable.  There are no previous EKGs to be to compare to    Patient placed on cardiac monitor for telemetry monitoring secondary to syncope. Interpretation at bedside by me is sinus rhythm pac's.                            MDM      See my HPI.  Both patient and wife verbalized understanding of the risk benefits and alternatives of treatment including potential for permanent injury, disability and/or death.  This is exactly similar to his previous episodes and he declined any workup whatsoever.  They understand they are welcome back anytime for any reason.  We discharged home at their request with return precaution provided and I recommend they follow-up with her PCP and specialist as an outpatient.    Some information obtained by EMS upon arrival, also his wife was at bedside    Differential diagnosis includes, but not limited to, syncope, ACS, PE, dehydration, electrolyte disturbance, infection, CVA, TIA    External chart review demonstrates outpatient [facility] cardiology follow-up as recently as December    Patient was screened and evaluated during this visit.  As the treating physician attending to the patient, I determined within reasonable clinical confidence and prior to discharge, that an emergency  medical condition was not or was no longer present.  There was no indication for further evaluation, treatment, or admission on an emergency basis.  Comprehensive verbal and written discharge and follow-up instructions were provided to help prevent relapse or worsening.  Patient was instructed to follow-up with their primary care provider for further evaluation and treatment, return immediately to ER for worsening, concerning, new, or changing/persisting symptoms. I discussed the case with the patient and they had no questions, complaints, or concerns.  Patient was comfortable going home.     Per the discharge paperwork, patients are encouraged to and given instructions on how to sign up for [facility], where they have access to their records, including any/all incidental findings.     This note was prepared using Dragon Medical voice recognition dictation software. As a result errors may occur. When identified these errors have been corrected. While every attempt is made to correct errors during dictation discrepancies may still exist    Note to patient: The 21st Century Cures Act makes medical notes like these available to patients in the interest of transparency. However, this is a medical document intended as peer to peer communication. It is written in medical language and may contain abbreviations or verbiage that are unfamiliar. It may appear blunt or direct. Medical documents are intended to carry relevant information, facts as evident, and the clinical opinion of the practitioner.                                          Medical Decision Making      Disposition and Plan     Clinical Impression:  1. Syncope, vasovagal         Disposition:  Discharge  5/26/2024 11:39 am    Follow-up:  Romero Epstein MD  [address] [place] [id number]  [phone]    Follow up            Medications Prescribed:  Current Discharge Medication List

## 2024-05-31 ENCOUNTER — OFFICE VISIT (OUTPATIENT)
Dept: INTERNAL MEDICINE CLINIC | Facility: CLINIC | Age: 79
End: 2024-05-31

## 2024-05-31 ENCOUNTER — LAB ENCOUNTER (OUTPATIENT)
Dept: LAB | Age: 79
End: 2024-05-31
Attending: FAMILY MEDICINE
Payer: MEDICARE

## 2024-05-31 VITALS
BODY MASS INDEX: 26.31 KG/M2 | HEART RATE: 92 BPM | HEIGHT: 67.72 IN | OXYGEN SATURATION: 93 % | RESPIRATION RATE: 14 BRPM | DIASTOLIC BLOOD PRESSURE: 64 MMHG | WEIGHT: 171.63 LBS | SYSTOLIC BLOOD PRESSURE: 120 MMHG

## 2024-05-31 DIAGNOSIS — F03.A0 MILD DEMENTIA WITHOUT BEHAVIORAL DISTURBANCE, PSYCHOTIC DISTURBANCE, MOOD DISTURBANCE, OR ANXIETY, UNSPECIFIED DEMENTIA TYPE (HCC): ICD-10-CM

## 2024-05-31 DIAGNOSIS — I48.92 ATRIAL FLUTTER, UNSPECIFIED TYPE (HCC): ICD-10-CM

## 2024-05-31 DIAGNOSIS — Z98.890 S/P ABLATION OF ATRIAL FLUTTER: ICD-10-CM

## 2024-05-31 DIAGNOSIS — R40.4 ALTERED LEVEL OF CONSCIOUSNESS: Primary | ICD-10-CM

## 2024-05-31 DIAGNOSIS — Z86.79 S/P ABLATION OF ATRIAL FLUTTER: ICD-10-CM

## 2024-05-31 DIAGNOSIS — R40.4 ALTERED LEVEL OF CONSCIOUSNESS: ICD-10-CM

## 2024-05-31 DIAGNOSIS — R55 VASOVAGAL EPISODE: ICD-10-CM

## 2024-05-31 LAB
ANION GAP SERPL CALC-SCNC: 6 MMOL/L (ref 0–18)
BUN BLD-MCNC: 23 MG/DL (ref 9–23)
CALCIUM BLD-MCNC: 8.6 MG/DL (ref 8.5–10.1)
CHLORIDE SERPL-SCNC: 105 MMOL/L (ref 98–112)
CO2 SERPL-SCNC: 30 MMOL/L (ref 21–32)
CREAT BLD-MCNC: 0.91 MG/DL
EGFRCR SERPLBLD CKD-EPI 2021: 85 ML/MIN/1.73M2 (ref 60–?)
FASTING STATUS PATIENT QL REPORTED: NO
GLUCOSE BLD-MCNC: 96 MG/DL (ref 70–99)
MAGNESIUM SERPL-MCNC: 2.5 MG/DL (ref 1.6–2.6)
OSMOLALITY SERPL CALC.SUM OF ELEC: 296 MOSM/KG (ref 275–295)
POTASSIUM SERPL-SCNC: 4.2 MMOL/L (ref 3.5–5.1)
SODIUM SERPL-SCNC: 141 MMOL/L (ref 136–145)
TSI SER-ACNC: 1.99 MIU/ML (ref 0.36–3.74)

## 2024-05-31 PROCEDURE — 80048 BASIC METABOLIC PNL TOTAL CA: CPT

## 2024-05-31 PROCEDURE — 36415 COLL VENOUS BLD VENIPUNCTURE: CPT

## 2024-05-31 PROCEDURE — 83735 ASSAY OF MAGNESIUM: CPT

## 2024-05-31 PROCEDURE — 99214 OFFICE O/P EST MOD 30 MIN: CPT | Performed by: FAMILY MEDICINE

## 2024-05-31 PROCEDURE — 84443 ASSAY THYROID STIM HORMONE: CPT

## 2024-05-31 NOTE — PROGRESS NOTES
Bandar Doyle  7/26/1945    Chief Complaint   Patient presents with    ER F/U     Rm 1 kb-5/26/2024- passed out at [employer]         HPI:   Bandar Doyle is a 78 year old male who presents for ER follow-up.  He was taken to the emergency room after having another vasovagal event.  Per his wife, he was sitting at [employer] and had an altered level of consciousness where he became unresponsive but never passed out.  He was sweating during the time but denied any chest pain, dyspnea, palpitations.  In the ER he had an EKG that showed sinus rhythm.  Patient's wife deferred any further evaluation or admission and just wanted to be discharged home as he gets anxious and sundown's in the hospital.  Since discharge, he has been completely back to his baseline with no focal neurologic deficits or cardiorespiratory symptoms.    Current Outpatient Medications   Medication Sig Dispense Refill    losartan 25 MG Oral Tab Take 1 tablet (25 mg total) by mouth daily.      latanoprost 0.005 % Ophthalmic Solution Apply 1 drop to eye.  4      Allergies   Allergen Reactions    Influenza Vaccines OTHER (SEE COMMENTS)     Leg pain from waist to feet    Tetracyclines & Related HIVES      Past Medical History:    Dementia (HCC)    Essential hypertension    SVT (supraventricular tachycardia) (HCC)      Patient Active Problem List   Diagnosis    Memory loss    Vision changes    Glaucoma of both eyes    Primary hypertension    Vasovagal syncope    Atrial flutter (HCC)    Mild dementia without behavioral disturbance, psychotic disturbance, mood disturbance, or anxiety (HCC)    BPH with obstruction/lower urinary tract symptoms      Past Surgical History:   Procedure Laterality Date    Hernia surgery  2013    Other  03/2017    Loop recorder      Family History   Problem Relation Age of Onset    Stroke Father     Hypertension Mother     Cancer Mother         pancreatic    Diabetes Maternal Grandmother     Cancer Sister     Heart Disorder Brother        Social History     Socioeconomic History    Marital status:    Tobacco Use    Smoking status: Former     Current packs/day: 0.00     Types: Cigarettes     Quit date:      Years since quittin.4     Passive exposure: Past    Smokeless tobacco: Former   Vaping Use    Vaping status: Never Used   Substance and Sexual Activity    Alcohol use: No    Drug use: No         REVIEW OF SYSTEMS:   GENERAL: feels well otherwise, no recent illness, no new CP, dyspnea, facial droop, numbness or weakness of extremities.    EXAM:   /64   Pulse 92   Resp 14   Ht 5' 7.72\" (1.72 m)   Wt 171 lb 9.6 oz (77.8 kg)   SpO2 93%   BMI 26.31 kg/m²   GENERAL: Well developed, well nourished,in no apparent distress  SKIN: No rashes,no suspicious lesions  EYES: PERRLA, EOMI, conjunctiva are clear  HEENT: atraumatic, normocephalic,ears and throat are clear  NECK: supple,no adenopathy,no bruits  LUNGS: clear to auscultation  CARDIO: RRR without murmur  GI: soft, NT  NEURO: CN II-XII intact, no focal deficits. Orientation at baseline.     ASSESSMENT AND PLAN:   Bandar Doyle is a 78 year old male who presents for ER follow-up    Altered level of consciousness, Vasovagal episode  Atrial flutter, unspecified type (HCC)  S/P ablation of atrial flutter  Mild dementia without behavioral disturbance  His symptoms are consistent with his previous vasovagal episodes per his wife.  He is completely back to his baseline at this time.  Patient's wife deferred further evaluation in the emergency room and here today.  His EKG from the ED did show normal sinus rhythm.  He does have a loop recorder that is currently not functional which was placed for evaluation of his vasovagal episodes by his prior cardiologist, but overall his previous cardiac evaluation was unrevealing.  He is having no new palpitations, chest pain or dyspnea at this time and no new neurologic deficits. We will check serologies as below today.  I encouraged follow-up  with his cardiologist and has upcoming follow-up with his neurologist next week.    - Basic Metabolic Panel (8); Future  - Magnesium [E]; Future  - TSH W Reflex To Free T4 [E]; Future      All questions were answered and the patient agrees with the plan.     Thank you,  Romero Epstein MD

## 2024-06-06 PROBLEM — F02.B0 MODERATE ALZHEIMER'S DEMENTIA WITHOUT BEHAVIORAL DISTURBANCE, PSYCHOTIC DISTURBANCE, MOOD DISTURBANCE, OR ANXIETY (HCC): Status: ACTIVE | Noted: 2024-04-15

## 2024-06-06 PROBLEM — G30.9 MODERATE ALZHEIMER'S DEMENTIA WITHOUT BEHAVIORAL DISTURBANCE, PSYCHOTIC DISTURBANCE, MOOD DISTURBANCE, OR ANXIETY (HCC): Status: ACTIVE | Noted: 2024-04-15

## 2024-11-04 ENCOUNTER — OFFICE VISIT (OUTPATIENT)
Dept: INTERNAL MEDICINE CLINIC | Facility: CLINIC | Age: 79
End: 2024-11-04
Payer: MEDICARE

## 2024-11-04 VITALS
SYSTOLIC BLOOD PRESSURE: 128 MMHG | DIASTOLIC BLOOD PRESSURE: 76 MMHG | HEIGHT: 67.72 IN | OXYGEN SATURATION: 96 % | HEART RATE: 76 BPM | WEIGHT: 175.81 LBS | TEMPERATURE: 98 F | BODY MASS INDEX: 26.96 KG/M2 | RESPIRATION RATE: 18 BRPM

## 2024-11-04 DIAGNOSIS — N13.8 BPH WITH OBSTRUCTION/LOWER URINARY TRACT SYMPTOMS: ICD-10-CM

## 2024-11-04 DIAGNOSIS — N40.1 BPH WITH OBSTRUCTION/LOWER URINARY TRACT SYMPTOMS: ICD-10-CM

## 2024-11-04 DIAGNOSIS — I48.92 ATRIAL FLUTTER, UNSPECIFIED TYPE (HCC): ICD-10-CM

## 2024-11-04 DIAGNOSIS — Z00.00 ENCOUNTER FOR ANNUAL HEALTH EXAMINATION: Primary | ICD-10-CM

## 2024-11-04 DIAGNOSIS — R41.3 MEMORY LOSS: ICD-10-CM

## 2024-11-04 DIAGNOSIS — I10 PRIMARY HYPERTENSION: ICD-10-CM

## 2024-11-04 DIAGNOSIS — H40.9 GLAUCOMA OF BOTH EYES, UNSPECIFIED GLAUCOMA TYPE: ICD-10-CM

## 2024-11-04 DIAGNOSIS — G30.9 MODERATE ALZHEIMER'S DEMENTIA WITHOUT BEHAVIORAL DISTURBANCE, PSYCHOTIC DISTURBANCE, MOOD DISTURBANCE, OR ANXIETY, UNSPECIFIED TIMING OF DEMENTIA ONSET (HCC): ICD-10-CM

## 2024-11-04 DIAGNOSIS — R55 VASOVAGAL SYNCOPE: ICD-10-CM

## 2024-11-04 DIAGNOSIS — F02.B0 MODERATE ALZHEIMER'S DEMENTIA WITHOUT BEHAVIORAL DISTURBANCE, PSYCHOTIC DISTURBANCE, MOOD DISTURBANCE, OR ANXIETY, UNSPECIFIED TIMING OF DEMENTIA ONSET (HCC): ICD-10-CM

## 2024-11-04 PROBLEM — H53.9 VISION CHANGES: Status: RESOLVED | Noted: 2019-09-26 | Resolved: 2024-11-04

## 2024-11-04 NOTE — PROGRESS NOTES
Subjective:   Bandar Doyle is a 79 year old male who presents for a Medicare Initial Annual Wellness visit (Once after 12 month Medicare anniversary)  and scheduled follow up of multiple significant but stable problems. Overall stable. No recurrent vasovagal episodes. Has been monitoring BP at home daily with good control. Had recent follow-up with neurology. Staying mentally active with crossword puzzles and games and staying social.     History/Other:   Fall Risk Assessment:   He has been screened for Falls and is low risk.      Cognitive Assessment:   Abnormal  What day of the week is this?: Incorrect  What month is it?: Incorrect  What year is it?: Incorrect  Recall \"Ball\": Incorrect  Recall \"Flag\": Incorrect  Recall \"Tree\": Incorrect    Functional Ability/Status:   Bandar Doyle has some abnormal functions as listed below:  He has Driving difficulties based on screening of functional status. He has difficulties Managing Money/Bills based on screening of functional status.He has problems with Memory based on screening of functional status.       Depression Screening (PHQ):  PHQ-2 SCORE: 0  , done 11/4/2024   If you checked off any problems, how difficult have these problems made it for you to do your work, take care of things at home, or get along with other people?: Not difficult at all    Last [place] Suicide Screening on 11/4/2024 was No Risk.     <5 minutes spent screening and counseling for depression    Advanced Directives:   He does have a Living Will but we do NOT have it on file in Epic.    He does have a POA but we do NOT have it on file in Epic.    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    Current Medications:  Outpatient Medications Marked as Taking for the 24 encounter (Office Visit) with Romero Epstein MD   Medication Sig    losartan 25 MG Oral Tab Take 1 tablet (25 mg total) by mouth daily.    latanoprost 0.005 % Ophthalmic Solution Apply 1 drop to eye.       Medical History:  He  has a past medical history of Dementia (HCC), Essential hypertension, and SVT (supraventricular tachycardia) ([facility]).  Surgical History:  He  has a past surgical history that includes other (2017) and hernia surgery ().   Family History:  His family history includes Cancer in his mother and sister; Diabetes in his maternal grandmother; Heart Disorder in his brother; Hypertension in his mother; Stroke in his father.  Social History:  He  reports that he quit smoking about 49 years ago. His smoking use included cigarettes. He has been exposed to tobacco smoke. He has quit using smokeless tobacco. He reports that he does not drink alcohol and does not use drugs.    Tobacco:  He smoked tobacco in the past but quit greater than 12 months ago.  Social History     Tobacco Use   Smoking Status Former    Current packs/day: 0.00    Types: Cigarettes    Quit date:     Years since quittin.8    Passive exposure: Past   Smokeless Tobacco Former          CAGE Alcohol Screen:   CAGE screening score of 0 on 10/28/2024, showing low risk of alcohol abuse.      Patient Care Team:  Romero Epstein MD as PCP - General (Family Medicine)  Maxi Perez DO as Consulting Physician (NEUROLOGY)    Review of Systems     Negative except as in HPI    Objective:   Physical Exam  /76   Pulse 76   Temp 97.5 °F (36.4 °C) (Temporal)   Resp 18   Ht 5' 7.72\" (1.72 m)   Wt 175 lb 12.8 oz (79.7 kg)   SpO2 96%   BMI 26.95 kg/m²  Estimated body mass index is 26.95 kg/m² as calculated from the following:    Height as of this encounter: 5' 7.72\" (1.72  m).    Weight as of this encounter: 175 lb 12.8 oz (79.7 kg).    GEN: no apparent distress  HEENT: DOMINGO, ears clear  CHEST: CTAB  CV: RRR  ABD: soft, NT    Medicare Hearing Assessment:   Hearing Screening    Time taken: 11/4/2024  9:42 AM  Entry User: Josseline White CMA  Screening Method: Finger Rub  Finger Rub Result: Pass           Assessment & Plan:   Bandar Doyle is a 79 year old male who presents for a Medicare Assessment.     Encounter for annual health examination    Atrial flutter, unspecified type (HCC)  S/P ablation, has maintained SR. Off anticoagulation. Following with cardiology.     Vasovagal syncope  No recurrent episodes since last. CTM    Primary hypertension  Controlled on current treatment. Can reduce frequency of home monitoring given stability.     Moderate Alzheimer's dementia  Memory loss  Has not tolerated Aricept or Namenda. Following with neurology.     BPH with obstruction/lower urinary tract symptoms  S/P UroLift, overall stable.     Glaucoma of both eyes, unspecified glaucoma type  Following with ophtho.     The patient indicates understanding of these issues and agrees to the plan.  Continue with current treatment plan.      Return in 6 months (on 5/4/2025).     Romero Epstein MD, 11/4/2024     Supplementary Documentation:   General Health:  In the past six months, have you lost more than 10 pounds without trying?: (Patient-Rptd) 2 - No  Has your appetite been poor?: (Patient-Rptd) No  Type of Diet: (Patient-Rptd) Balanced  How does the patient maintain a good energy level?: (Patient-Rptd) Appropriate Exercise  How would you describe your daily physical activity?: (Patient-Rptd) Moderate  How would you describe your current health state?: (Patient-Rptd) Good  How do you maintain positive mental well-being?: (Patient-Rptd) Social Interaction;Puzzles;Visiting Friends;Visiting Family  On a scale of 0 to 10, with 0 being no pain and 10 being severe pain, what is your pain level?:  (Patient-Rptd) 0 - (None)  In the past six months, have you experienced urine leakage?: (Patient-Rptd) 0-No  At any time do you feel concerned for the safety/well-being of yourself and/or your children, in your home or elsewhere?: (Patient-Rptd) No  Have you had any immunizations at another office such as Influenza, Hepatitis B, Tetanus, or Pneumococcal?: (Patient-Rptd) Yes    Health Maintenance   Topic Date Due    Annual Physical  Never done    Zoster Vaccines (1 of 2) Never done    Influenza Vaccine (1) 10/01/2024    Annual Depression Screening  Completed    Fall Risk Screening (Annual)  Completed    Pneumococcal Vaccine: 65+ Years  Completed    COVID-19 Vaccine  Completed

## 2025-05-12 ENCOUNTER — OFFICE VISIT (OUTPATIENT)
Age: 80
End: 2025-05-12
Payer: MEDICARE

## 2025-05-12 VITALS
SYSTOLIC BLOOD PRESSURE: 122 MMHG | DIASTOLIC BLOOD PRESSURE: 72 MMHG | HEART RATE: 90 BPM | BODY MASS INDEX: 27.84 KG/M2 | HEIGHT: 67 IN | WEIGHT: 177.38 LBS

## 2025-05-12 DIAGNOSIS — Z87.898 HISTORY OF SYNCOPE: ICD-10-CM

## 2025-05-12 DIAGNOSIS — G30.9 MODERATE ALZHEIMER'S DEMENTIA WITHOUT BEHAVIORAL DISTURBANCE, PSYCHOTIC DISTURBANCE, MOOD DISTURBANCE, OR ANXIETY, UNSPECIFIED TIMING OF DEMENTIA ONSET (HCC): ICD-10-CM

## 2025-05-12 DIAGNOSIS — F02.B0 MODERATE ALZHEIMER'S DEMENTIA WITHOUT BEHAVIORAL DISTURBANCE, PSYCHOTIC DISTURBANCE, MOOD DISTURBANCE, OR ANXIETY, UNSPECIFIED TIMING OF DEMENTIA ONSET (HCC): ICD-10-CM

## 2025-05-12 DIAGNOSIS — I10 PRIMARY HYPERTENSION: Primary | ICD-10-CM

## 2025-05-12 PROCEDURE — 99213 OFFICE O/P EST LOW 20 MIN: CPT | Performed by: FAMILY MEDICINE

## 2025-05-12 NOTE — PROGRESS NOTES
Bandar Doyle  7/26/1945    Chief Complaint   Patient presents with    Follow - Up     6mth follow up no concerns   ROOM:10       HPI:   Bandar Doyle is a 79 year old male who presents for follow-up. Overall has been stable. Has upcoming echocardiogram scheduled in June. No new concerns.     Current Medications[1]   Allergies[2]   Past Medical History[3]   Problem List[4]   Past Surgical History[5]   Family History[6]   Short Social Hx on File[7]      REVIEW OF SYSTEMS:   GENERAL: no recent illness, no new CP or dyspnea.     EXAM:   /72   Pulse 90   Ht 5' 7\" (1.702 m)   Wt 177 lb 6.4 oz (80.5 kg)   BMI 27.78 kg/m²   GENERAL: Well developed, well nourished,in no apparent distress  SKIN: No rash  EYES: PERRLA, EOMI, conjunctiva are clear  HEENT: atraumatic, normocephalic,ears and throat are clear  NECK: supple,no adenopathy,no bruits  LUNGS: clear to auscultation  CARDIO: RRR without murmur    ASSESSMENT AND PLAN:   Bandar Doyle is a 79 year old male who presents for follow-up    Primary hypertension  Controlled on current treatment.   - CBC W Differential W Platelet [E]; Future    Moderate Alzheimer's dementia  Overall stable. Has not tolerated Aricept or Namenda.     History of syncope  No recurrence since he has stopped going to [employer]. Has established with cardiology and has upcoming echo schedule.       All questions were answered and the patient agrees with the plan.     Thank you,  Romero Epstein MD         [1]   Current Outpatient Medications   Medication Sig Dispense Refill    losartan 25 MG Oral Tab Take 1 tablet (25 mg total) by mouth daily.      latanoprost 0.005 % Ophthalmic Solution Apply 1 drop to eye.  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, psychotic disturbance, mood disturbance, or anxiety (HCC)    BPH with obstruction/lower urinary tract symptoms   [5]   Past Surgical History:  Procedure Laterality Date    Hernia surgery      Other  2017    Loop recorder   [6]   Family History  Problem Relation Age of Onset    Stroke Father     Hypertension Mother     Cancer Mother         pancreatic    Diabetes Maternal Grandmother     Cancer Sister     Heart Disorder Brother    [7]   Social History  Socioeconomic History    Marital status:    Tobacco Use    Smoking status: Former     Current packs/day: 0.00     Types: Cigarettes     Quit date:      Years since quittin.3     Passive exposure: Past    Smokeless tobacco: Former   Vaping Use    Vaping status: Never Used   Substance and Sexual Activity    Alcohol use: No    Drug use: No    Sexual activity: Not Currently   Other Topics Concern    Caffeine Concern No     Comment: tea    Exercise Yes     Comment: walks 30 min 5 days week    Seat Belt No

## 2025-05-28 ENCOUNTER — LAB REQUISITION (OUTPATIENT)
Dept: LAB | Facility: HOSPITAL | Age: 80
End: 2025-05-28
Payer: MEDICARE

## 2025-05-28 DIAGNOSIS — I10 ESSENTIAL (PRIMARY) HYPERTENSION: ICD-10-CM

## 2025-05-28 LAB
ALBUMIN SERPL-MCNC: 4.3 G/DL (ref 3.2–4.8)
ALBUMIN/GLOB SERPL: 1.6 {RATIO} (ref 1–2)
ALP LIVER SERPL-CCNC: 57 U/L (ref 45–117)
ALT SERPL-CCNC: <7 U/L (ref 10–49)
ANION GAP SERPL CALC-SCNC: 8 MMOL/L (ref 0–18)
AST SERPL-CCNC: 17 U/L (ref ?–34)
BILIRUB SERPL-MCNC: 1.4 MG/DL (ref 0.2–1.1)
BUN BLD-MCNC: 18 MG/DL (ref 9–23)
CALCIUM BLD-MCNC: 8.9 MG/DL (ref 8.7–10.6)
CHLORIDE SERPL-SCNC: 103 MMOL/L (ref 98–112)
CHOLEST SERPL-MCNC: 155 MG/DL (ref ?–200)
CO2 SERPL-SCNC: 29 MMOL/L (ref 21–32)
CREAT BLD-MCNC: 1.14 MG/DL (ref 0.7–1.3)
EGFRCR SERPLBLD CKD-EPI 2021: 65 ML/MIN/1.73M2 (ref 60–?)
FASTING PATIENT LIPID ANSWER: YES
FASTING STATUS PATIENT QL REPORTED: YES
GLOBULIN PLAS-MCNC: 2.7 G/DL (ref 2–3.5)
GLUCOSE BLD-MCNC: 94 MG/DL (ref 70–99)
HDLC SERPL-MCNC: 56 MG/DL (ref 40–59)
LDLC SERPL CALC-MCNC: 88 MG/DL (ref ?–100)
NONHDLC SERPL-MCNC: 99 MG/DL (ref ?–130)
OSMOLALITY SERPL CALC.SUM OF ELEC: 292 MOSM/KG (ref 275–295)
POTASSIUM SERPL-SCNC: 4 MMOL/L (ref 3.5–5.1)
PROT SERPL-MCNC: 7 G/DL (ref 5.7–8.2)
SODIUM SERPL-SCNC: 140 MMOL/L (ref 136–145)
TRIGL SERPL-MCNC: 55 MG/DL (ref 30–149)
VLDLC SERPL CALC-MCNC: 9 MG/DL (ref 0–30)

## 2025-05-28 PROCEDURE — 80061 LIPID PANEL: CPT | Performed by: INTERNAL MEDICINE

## 2025-05-28 PROCEDURE — 80053 COMPREHEN METABOLIC PANEL: CPT | Performed by: INTERNAL MEDICINE

## 2025-06-03 ENCOUNTER — OFFICE VISIT (OUTPATIENT)
Dept: NEUROLOGY | Facility: CLINIC | Age: 80
End: 2025-06-03
Payer: MEDICARE

## 2025-06-03 ENCOUNTER — LAB ENCOUNTER (OUTPATIENT)
Dept: LAB | Age: 80
End: 2025-06-03
Attending: FAMILY MEDICINE
Payer: MEDICARE

## 2025-06-03 VITALS
BODY MASS INDEX: 28 KG/M2 | RESPIRATION RATE: 16 BRPM | WEIGHT: 177 LBS | HEART RATE: 64 BPM | DIASTOLIC BLOOD PRESSURE: 64 MMHG | SYSTOLIC BLOOD PRESSURE: 124 MMHG

## 2025-06-03 DIAGNOSIS — R41.3 MEMORY LOSS: ICD-10-CM

## 2025-06-03 DIAGNOSIS — R41.3 MEMORY LOSS: Primary | ICD-10-CM

## 2025-06-03 PROCEDURE — 36415 COLL VENOUS BLD VENIPUNCTURE: CPT

## 2025-06-03 PROCEDURE — 99213 OFFICE O/P EST LOW 20 MIN: CPT | Performed by: OTHER

## 2025-06-03 PROCEDURE — 83520 IMMUNOASSAY QUANT NOS NONAB: CPT

## 2025-06-03 NOTE — PATIENT INSTRUCTIONS
Refill policies:    Allow 2-3 business days for refills; controlled substances may take longer.  Contact your pharmacy at least 5 days prior to running out of medication and have them send an electronic request or submit request through the “request refill” option in your [employer] account.  Refills are not addressed on weekends; covering physicians do not authorize routine medications on weekends.  No narcotics or controlled substances are refilled after noon on Fridays or by on call physicians.  By law, narcotics must be electronically prescribed.  A 30 day supply with no refills is the maximum allowed.  If your prescription is due for a refill, you may be due for a follow up appointment.  To best provide you care, patients receiving routine medications need to be seen at least once a year.  Patients receiving narcotic/controlled substance medications need to be seen at least once every 3 months.  In the event that your preferred pharmacy does not have the requested medication in stock (e.g. Backordered), it is your responsibility to find another pharmacy that has the requested medication available.  We will gladly send a new prescription to that pharmacy at your request.    Scheduling Tests:    If your physician has ordered radiology tests such as MRI or CT scans, please contact Central Scheduling at [phone] right away to schedule the test.  Once scheduled, the [facility] Centralized Referral Team will work with your insurance carrier to obtain pre-certification or prior authorization.  Depending on your insurance carrier, approval may take 3-10 days.  It is highly recommended patients assure they have received an authorization before having a test performed.  If test is done without insurance authorization, patient may be responsible for the entire amount billed.      Precertification and Prior Authorizations:  If your physician has recommended that you have a procedure or additional testing performed the [facility]  Centralized Referral Team will contact your insurance carrier to obtain pre-certification or prior authorization.    You are strongly encouraged to contact your insurance carrier to verify that your procedure/test has been approved and is a COVERED benefit.  Although the [facility] Centralized Referral Team does its due diligence, the insurance carrier gives the disclaimer that \"Although the procedure is authorized, this does not guarantee payment.\"    Ultimately the patient is responsible for payment.   Thank you for your understanding in this matter.  Paperwork Completion:  If you require FMLA or disability paperwork for your recovery, please make sure to either drop it off or have it faxed to our office at [phone]. Be sure the form has your name and date of birth on it.  The form will be faxed to our Forms Department and they will complete it for you.  There is a 25$ fee for all forms that need to be filled out.  Please be aware there is a 10-14 day turnaround time.  You will need to sign a release of information (MICKEY) form if your paperwork does not come with one.  You may call the Forms Department with any questions at [phone].  Their fax number is [phone].

## 2025-06-03 NOTE — PROGRESS NOTES
The following individual(s) verbally consented to be recorded using ambient AI listening technology and understand that they can each withdraw their consent to this listening technology at any point by asking the clinician to turn off or pause the recording:    Patient name: Bandar Doyle  Additional names:  Shira Doyle, Lynnette Bustillos

## 2025-06-03 NOTE — PROGRESS NOTES
Neurology H&P    Bandar Doyle Patient Status:  No patient class for patient encounter    1945 MRN [id number]   Location [facility] Attending No att. providers found    Day # 0 PCP Romero Epstein MD     Subjective:  Initial Clinic HPI 18  Bandar Doyle is a(n) 79 year old male with a PMH significant for episodes of syncope (currently being worked up by cardiology) and RLS. He comes to the clinic today for short term memory loss. He states that he is having to write things down to remember them. He has no difficulty with long term memory loss. He states that his memory loss seems to have started about 5 years ago. His wife feels that his is getting worse and his children are started to notice this now. His wife states that she has to remind him many times throughout the day about plans and conversations that they may have recently had. He takes care of all of his own ADLs. He drives and has no difficulty with getting lost in or around the home. He has not had any unexplained accidents. He bathes, and dresses himself without difficulty. He takes care of his own finances and is never late with bills. He denies any gait imbalance or falls (other than slip on ice). He did have a concussion as a child. He has not been missing any important dates or family members names etc. He denies any headaches or vision changes or difficulty swallowing. He denies any weakness or numbness or tingling. He denies any FH of dementia or tremors. His wife states that he has an occasional fine tremor in one of his hands (not sure which one) and possibly jaw or tongue. He had a recent EEG and MRI brain and per reports these tests were unremarkable (mild microvascular ischemic changes). He wakes up a lot at night to urinate but has no RLS symptoms.        Interim History:  Pt was last seen in clinic on 24. He comes back to the clinic for a follow up for memory loss. We have tried  both Namenda and donepezil in the past. He reported intolerable side effects to both. We have discussed exalon patch and galantamine as well but he declined any treatment. He comes back to the clinic with his wife and daughter today.  Per wife he still attends to all of his own ADLs without assistance. He is playing words with friends to stimulate his mid. She states that his short term memory is getting worse. He did not remember me today or why he is here. His family still feel that they do not want to start any medications for dementia. He has not had any behavioral changes. He has more word finding difficulty.        Current Medications:  Current Outpatient Medications   Medication Sig Dispense Refill    losartan 25 MG Oral Tab Take 1 tablet (25 mg total) by mouth daily.      latanoprost 0.005 % Ophthalmic Solution Apply 1 drop to eye.  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, psychotic disturbance, mood disturbance, or anxiety (HCC)    BPH with obstruction/lower urinary tract symptoms       PMHx:  Past Medical History:    Dementia (HCC)    Essential hypertension    SVT (supraventricular tachycardia) (HCC)       PSHx:  Past Surgical History:   Procedure Laterality Date    Hernia surgery  2013    Other  2017    Loop recorder       SocHx:  Social History     Socioeconomic History    Marital status:    Tobacco Use    Smoking status: Former     Current packs/day: 0.00     Types: Cigarettes     Quit date:      Years since quittin.4     Passive exposure: Past    Smokeless tobacco: Former   Vaping Use    Vaping status: Never Used   Substance and Sexual Activity    Alcohol use: No    Drug use: No    Sexual activity: Not Currently   Other Topics Concern    Caffeine Concern No     Comment: tea    Exercise Yes      Comment: walks 30 min 5 days week    Seat Belt No       Family History:  Family History   Problem Relation Age of Onset    Stroke Father     Hypertension Mother     Cancer Mother         pancreatic    Diabetes Maternal Grandmother     Cancer Sister     Heart Disorder Brother        ROS:  10 point ROS completed and pertinent positives in HPI    Objective/Physical Exam:    Vital Signs:  There were no vitals taken for this visit.    Gen: Awake and in no apparent distress  HEENT: moist mucus membranes  Neck: Supple  Cardiovascular: Regular rate and rhythm, no murmur  Pulm: CTAB  GI: non-tender, normal bowel sounds  Skin: normal, dry  Extremities: No clubbing or cyanosis      Neurologic:   MENTAL STATUS: alert, and oriented to person only (not year month or even day of the week), serial 7's to 1 digits, able to tell me similarities between watch and ruler and train and bicycle, 5 digit forward and 3 digit reverse number span, 0/5 word recall      MOCA 1/23/18:  26/30 with points off for 2/5 word recall, and incorrect date    CRANIAL NERVES II to XII: PERRLA, no ptosis or diplopia, EOM intact, facial sensation intact, strong eye closure and lower facial muscles & jaw muscles; palate elevation intact, no dysarthria, no tongue fasciculations or atrophy, strong shoulder shrug.    MOTOR EXAMINATION: normal tone, no fasciculations, normal strength throughout in UEs and LEs     SENSORY EXAMINATION:  UE: intact to light touch,   LE: intact to light touch,     COORDINATION:  No dysmetria, BUE intention tremor     REFLEXES: 2+ at biceps, 2+ triceps, 2+ at patella     GAIT: normal stance, normal gait       Labs:       Imaging:  MRI brain from OSH imaging consistent with mild microvascular ischemic changes. No strokes     EEG [facility] no ictal or interictal activity.    Assessment:  This is a 78 y/o male with history of migraine and worsening memory. Likely has a mild dementia at this point. Neuropsychology memory testing is in Epic  from 2019 and was most consistent with a mild cognitive impairment but his memory has continued to decline over the past several years. . He has not been able to tolerate even small doses of Aricept (hallucinations, nightmares etc.) or Namenda (bradycardia and syncope). Family decline any trial of Exalon or galantamine. I did offer a repeat neurocognitive testing but he and the family decline this. I will order a beta Amyloid 42/40 ratio     Plan:  1. Memory loss  - MCI vs early dementia  - [facility]psychology report in [facility] from 2019  - MOCA 1/23/18: 26/30  - MOCA 7/30/18: 27/30  - Has tried both Aricept and Namenda both at low starting doses and reported intolerable side effects to both  - Declines Exalon or galantamine  - Declines any referral for a neurocognitive evaluation  - Beta Amyloid 42/40 ratio orderd         RTC in 12 months if needed.      Maxi Perez, DO  Neurology

## 2025-06-06 LAB
BETA-AMYLOID 40: 220.68 PG/ML
BETA-AMYLOID 40: 220.68 PG/ML
BETA-AMYLOID 42/40 RATIO: 0.09
BETA-AMYLOID 42/40 RATIO: 0.09
BETA-AMYLOID 42: 20.26 PG/ML
BETA-AMYLOID 42: 20.26 PG/ML

## (undated) DEVICE — SHEATH INTRO VIZIGO STRL LF DISP

## (undated) DEVICE — TUBING ABLT CARTO SMARTABLATE PUMP STRL

## (undated) DEVICE — CATHETER D-F CRV THRMCPL BIIDIRECTIONAL THERMOCOOL

## (undated) DEVICE — INTRODUCER SHTH 8FR 50CM 12CM GW HEMOSTASIS VLV SDPRT DIL

## (undated) DEVICE — CATHETER DUODECA STRBL 7FR 2-10-2MM SPACE SUP LG

## (undated) DEVICE — CATHETER MAPPING CARTO 3 XTRN NS LF DISP

## (undated) DEVICE — CATHETER 8FR SOUNDSTAR ANGIO

## (undated) DEVICE — CUFF BP ACUMEN IQ SM FNGR NINVS AUTOMATIC CALCULATE UNLOCK

## (undated) DEVICE — CATHETER 7FR 115CM 2MM 2-8-2MM F CRV 10 POLE TIP DECANAV